# Patient Record
Sex: FEMALE | Race: WHITE
[De-identification: names, ages, dates, MRNs, and addresses within clinical notes are randomized per-mention and may not be internally consistent; named-entity substitution may affect disease eponyms.]

---

## 2018-04-18 ENCOUNTER — HOSPITAL ENCOUNTER (OUTPATIENT)
Dept: HOSPITAL 35 - PAIN | Age: 68
End: 2018-04-18
Attending: ANESTHESIOLOGY
Payer: COMMERCIAL

## 2018-04-18 VITALS — HEIGHT: 60.98 IN | WEIGHT: 145.8 LBS | BODY MASS INDEX: 27.53 KG/M2

## 2018-04-18 VITALS — DIASTOLIC BLOOD PRESSURE: 67 MMHG | SYSTOLIC BLOOD PRESSURE: 126 MMHG

## 2018-04-18 DIAGNOSIS — M17.0: ICD-10-CM

## 2018-04-18 DIAGNOSIS — G89.29: Primary | ICD-10-CM

## 2018-04-25 ENCOUNTER — HOSPITAL ENCOUNTER (OUTPATIENT)
Dept: HOSPITAL 35 - PAIN | Age: 68
Discharge: HOME | End: 2018-04-25
Attending: ANESTHESIOLOGY
Payer: COMMERCIAL

## 2018-04-25 VITALS — BODY MASS INDEX: 26.45 KG/M2 | WEIGHT: 145.6 LBS | HEIGHT: 62.01 IN

## 2018-04-25 VITALS — SYSTOLIC BLOOD PRESSURE: 132 MMHG | DIASTOLIC BLOOD PRESSURE: 69 MMHG

## 2018-04-25 DIAGNOSIS — Z79.01: ICD-10-CM

## 2018-04-25 DIAGNOSIS — G89.29: Primary | ICD-10-CM

## 2018-04-25 DIAGNOSIS — Z79.899: ICD-10-CM

## 2018-04-25 DIAGNOSIS — M17.0: ICD-10-CM

## 2018-04-25 DIAGNOSIS — Z98.890: ICD-10-CM

## 2018-05-14 ENCOUNTER — HOSPITAL ENCOUNTER (OUTPATIENT)
Dept: HOSPITAL 35 - HYPER | Age: 68
End: 2018-05-14
Attending: PHYSICIAN ASSISTANT
Payer: COMMERCIAL

## 2018-05-14 DIAGNOSIS — I10: ICD-10-CM

## 2018-05-14 DIAGNOSIS — L97.521: ICD-10-CM

## 2018-05-14 DIAGNOSIS — Z90.710: ICD-10-CM

## 2018-05-14 DIAGNOSIS — I70.245: Primary | ICD-10-CM

## 2018-05-14 DIAGNOSIS — M06.9: ICD-10-CM

## 2018-05-31 ENCOUNTER — HOSPITAL ENCOUNTER (OUTPATIENT)
Dept: HOSPITAL 35 - HYPER | Age: 68
End: 2018-05-31
Attending: PREVENTIVE MEDICINE
Payer: COMMERCIAL

## 2018-05-31 DIAGNOSIS — Z90.710: ICD-10-CM

## 2018-05-31 DIAGNOSIS — L97.521: ICD-10-CM

## 2018-05-31 DIAGNOSIS — I10: ICD-10-CM

## 2018-05-31 DIAGNOSIS — I73.00: ICD-10-CM

## 2018-05-31 DIAGNOSIS — I70.245: Primary | ICD-10-CM

## 2018-05-31 DIAGNOSIS — M06.9: ICD-10-CM

## 2018-06-13 ENCOUNTER — HOSPITAL ENCOUNTER (OUTPATIENT)
Dept: HOSPITAL 35 - HYPER | Age: 68
End: 2018-06-13
Attending: PREVENTIVE MEDICINE
Payer: COMMERCIAL

## 2018-06-13 DIAGNOSIS — I10: ICD-10-CM

## 2018-06-13 DIAGNOSIS — M06.9: ICD-10-CM

## 2018-06-13 DIAGNOSIS — I70.245: Primary | ICD-10-CM

## 2018-06-13 DIAGNOSIS — L97.521: ICD-10-CM

## 2018-06-13 DIAGNOSIS — I73.00: ICD-10-CM

## 2018-06-27 ENCOUNTER — HOSPITAL ENCOUNTER (OUTPATIENT)
Dept: HOSPITAL 35 - HYPER | Age: 68
End: 2018-06-27
Attending: PREVENTIVE MEDICINE
Payer: COMMERCIAL

## 2018-06-27 DIAGNOSIS — I10: ICD-10-CM

## 2018-06-27 DIAGNOSIS — L97.521: ICD-10-CM

## 2018-06-27 DIAGNOSIS — I70.245: Primary | ICD-10-CM

## 2018-06-27 DIAGNOSIS — I73.00: ICD-10-CM

## 2018-06-27 DIAGNOSIS — M06.9: ICD-10-CM

## 2018-12-12 ENCOUNTER — HOSPITAL ENCOUNTER (OUTPATIENT)
Dept: HOSPITAL 35 - PAIN | Age: 68
End: 2018-12-12
Attending: ANESTHESIOLOGY
Payer: COMMERCIAL

## 2018-12-12 VITALS — HEIGHT: 62.01 IN | WEIGHT: 143.8 LBS | BODY MASS INDEX: 26.13 KG/M2

## 2018-12-12 VITALS — SYSTOLIC BLOOD PRESSURE: 117 MMHG | DIASTOLIC BLOOD PRESSURE: 65 MMHG

## 2018-12-12 DIAGNOSIS — Z79.899: ICD-10-CM

## 2018-12-12 DIAGNOSIS — G89.29: Primary | ICD-10-CM

## 2018-12-12 DIAGNOSIS — M79.662: ICD-10-CM

## 2018-12-12 DIAGNOSIS — Z96.653: ICD-10-CM

## 2021-11-23 ENCOUNTER — HOSPITAL ENCOUNTER (OUTPATIENT)
Dept: HOSPITAL 35 - PAIN | Age: 71
Discharge: HOME | End: 2021-11-23
Attending: ANESTHESIOLOGY
Payer: COMMERCIAL

## 2021-11-23 VITALS — BODY MASS INDEX: 27.07 KG/M2 | WEIGHT: 149 LBS | HEIGHT: 62.01 IN

## 2021-11-23 VITALS — DIASTOLIC BLOOD PRESSURE: 74 MMHG | SYSTOLIC BLOOD PRESSURE: 148 MMHG

## 2021-11-23 DIAGNOSIS — M48.061: ICD-10-CM

## 2021-11-23 DIAGNOSIS — Z79.899: ICD-10-CM

## 2021-11-23 DIAGNOSIS — Z98.890: ICD-10-CM

## 2021-11-23 DIAGNOSIS — Z88.8: ICD-10-CM

## 2021-11-23 DIAGNOSIS — M19.90: ICD-10-CM

## 2021-11-23 DIAGNOSIS — M48.02: ICD-10-CM

## 2021-11-23 DIAGNOSIS — G89.29: ICD-10-CM

## 2021-11-23 DIAGNOSIS — M54.12: Primary | ICD-10-CM

## 2021-11-23 DIAGNOSIS — Z87.19: ICD-10-CM

## 2021-11-23 DIAGNOSIS — G43.909: ICD-10-CM

## 2021-11-23 DIAGNOSIS — M06.9: ICD-10-CM

## 2021-11-23 NOTE — NUR
Pain Clinic Assessment:
 
1. History of Osteoarthritis:
UNKNOWN
   History of Rheumatoid Arthritis:
HANDS
ANKLES
FEET
KNEES
ELBOWS
 
2. Height: 5 ft. 2 in. 157.5 cm.
   Weight: 149.0 lb.  oz. 67.586 kg.
   Patient's BMI: 27.2
 
3. Vital Signs:
   BP: 148/74 Pulse: 75 Resp: 16
   Temp:  02 Sat: 100 ECG Mon:
 
4. Pain Intensity: 6
 
5. Fall Risk:
   Dizziness: N  Needs help standing or walking: N
   Fallen in the last 3 months: N
   Fall risk comments:
 
 
6. Patient on Blood Thinner: None
 
7. History of Hypertension: N
 
8. Opioid Therapy greater than 6 weeks: N
   Opiate Contract Signed:
 
9. Risk Assessment Tool Provided: 0-LOW
 
10. Functional Assessment Tool: 50/70
 
11. Recreational Drug Use: Never Drug Type:
    Tobacco Use: Never Smoker Tobacco Type:
       Amount or Packs/day:  How Many Years:
    Alcohol Use: No  Frequency:  Quant:

## 2021-11-24 NOTE — HPC
[facility]
[address]
[place]   [address]                     PAIN MANAGEMENT CONSULTATION  
_______________________________________________________________________________
 
Name:       JUAN PABLO ALEXANDER           [facility] #:                     [facility]      [id number]                       Account #:      [id number]  
Admission:  11/23/21    Attend Phys:    Garrett Proctor DO  
Discharge:              Date of Birth:  [birth year]  
                                                          Report #: [id number]
                                                                    [id number] 
_______________________________________________________________________________
THIS REPORT FOR:  
 
cc:  Shane Gutierrez MD, Mark S. MD Johnson, James E. DO                                          ~
 
cc:     Dr. Femi Bernardo
DATE OF SERVICE: 11/23/2021
 
REFERRING PHYSICIAN:  Dr. Femi Bernardo, Neurosurgeon
 
CHIEF COMPLAINT:  Neck pain, left upper extremity pain and paresthesias, left 
upper back pain.
 
HISTORY OF PRESENT ILLNESS:  As you know, the patient is a pleasant 71-year-old 
female who reports a longstanding history of left scapular pain, left arm pain 
with paresthesias.  She states the symptoms began in mid summer 2021 without 
inciting injury or traumas.  She was seen by orthopedics.  She felt that her 
symptoms may be related to shoulder issues:  She underwent left shoulder x-ray 
imaging, which were negative.  She was then sent for x-ray imaging of the 
cervical spine after she was complaining of pins and needle sensation in the 
left upper extremity.  She has been diagnosed with cervical radiculopathy and 
then referred on to Neurosurgery.  The patient was seen by Neurosurgery and 
advised that surgical options could be entertained to address the findings at 
C5-C6 and C6-C7 level, but it was determined that the patient should trial 
conservative treatment initially.  She was subsequently then referred on to our 
clinic to discuss treatment options.  She has now started physical therapy.  She
takes tramadol p.r.n., at bedtime.
 
The patient reports today pain is constant.  She describes the pain as a 
burning, shooting, aching and stabbing sensation.  Places current pain score 
6/10, daily average of 5/10, worst pain has been is 8/10.  The patient states 
the pain is exacerbated with rotating her neck to the left and utilizing a left 
upper extremity.  Nothing tends to improve pain.  She has been referred to our 
service.  Discussed cervical epidural injections under fluoroscopic guidance.
 
ALLERGIES:  No known drug allergies.
 
CURRENT MEDICATIONS:  Vitamin D3, ascorbic acid, Simponi, tramadol, 
furosemide, Claritin, ropinirole, atenolol.
 
SOCIAL HISTORY:  The patient denies tobacco, alcohol or IV or illicit drug use. 
She is accompanied by her  present in room today.
 
IMAGING:  MRI cervical spine shows reversal of cervical lordosis, C4-C5 
vertebral bodies are fused.  There is retrolisthesis of C5 on C6 measuring 2 mm.
 
 
 
[place] [address]                     PAIN MANAGEMENT CONSULTATION  
_______________________________________________________________________________
 
Name:       JUAN PABLO ALEXANDER           [facility] #:                     [facility]      [id number]                       Account #:      [id number]  
Admission:  11/23/21    Attend Phys:    Garrett Proctor DO  
Discharge:              Date of Birth:  [birth year]  
                                                          Report #: [id number]
                                                                    [id number] 
_______________________________________________________________________________
 There is anterolisthesis of C7 on T1 measuring 2 mm.  Minimal endplate edema is
noted at C5-C6 and C6-C7.  There is high-grade foraminal stenosis at C5-C6 with 
thecal sac reduced to 6 mm at C6-C7.  There is also buckling of the ligamentum 
flavum, moderate central canal stenosis, foraminal narrowing, that is moderate 
in nature and compression of the canal to 7 mm.
 
PQRS:  The patient has known arthritic changes of cervical spine, lumbar spine, 
bilateral hips and knees.  She is treated for rheumatoid arthritis with IV 
infusions.  She is placing pain intensity 6/10.  She is not a fall risk, has not
had a fall in last 3 months.  She is not on blood thinners nor is she treated 
for hypertension.  She is on opioid like medications, has a low opioid addiction
potential.  Pain impact is 50/70, severe interference of daily activities 
secondary to pain.
 
PHYSICAL EXAMINATION:
VITAL SIGNS:  Blood pressure 140/74, pulse 75, respiratory rate 16 and 
unlabored.  The patient is 100% on room air.  Height 5 feet 2 inches tall, 
weight 149 pounds, BMI calculated 27.2.
GENERAL:  Well-developed, well-nourished, well-hydrated 71-year-old female 
appearing stated age, pain is rated today 6/10.
HEENT:  Normocephalic, atraumatic.  Pupils equal, round and responsive.  Speech 
is fluent.  She is wearing a mask in compliance with COVID-19 regulations.
LUNGS:  Clear.  She is able to complete sentences.  There is no audible 
wheezing, rhonchi or rales.
EXTREMITIES:  Show no clubbing, no cyanosis and no edema.
MUSCULOSKELETAL:  Upper extremity strength appears symmetrical.  Muscle bulk and
tone is symmetrical in upper extremities.  Deep tendon reflexes are increased at
the biceps bilaterally.  Babinski is negative.  No clonus.  Cervical provocation
testing is met with increasing pain.  Spurling's test is positive for left, 
negative for right.  She is intact to light touch from C5 through T1 dermatomes.
 Tinel sign is negative.  Phalen sign is negative.  Lhermitte's phenomenon is 
negative.
 
ASSESSMENT:
1.  Cervical radiculopathy.
2.  Severe neural foraminal stenosis of the cervical spine.
3.  Cervical spinal stenosis.
4.  Cervical myelopathy.
5.  Chronic intractable pain.
 
PLAN:
1.  Based on today's physical exam and history the patient has provided, the 
description the patient uses in regards to pain as well as the descriptors she 
uses in regards to symptoms, likely source of the patient's pain is cervical 
radiculopathy.  The patient has seen Neurosurgery and they have advised the 
patient to trial conservative treatment initially.  If these are then 
 
 
 
[facility]
[address]
[place]   [address]                     PAIN MANAGEMENT CONSULTATION  
_______________________________________________________________________________
 
Name:       BENJAMINMARIOMELISSA PACE           Room #:                     REG CARLOTTA DOMINGUEZ#:      [id number]                       Account #:      [id number]  
Admission:  11/23/21    Attend Phys:    Garrett Proctor DO  
Discharge:              Date of Birth:  [birth year]  
                                                          Report #: [id number]
                                                                    [id number] 
_______________________________________________________________________________
unsuccessful in alleviating symptoms, surgical options could be entertained.  
The patient was to begin physical therapy this Friday.  She has not started the 
physical program to date.  She was advised to delay undergoing the epidural 
injection first.  If this is unsuccessful, then moving towards physical therapy.
 The patient and I also discussed the other treatment options we have to address
cervical radiculopathy.  Following was discussed with the patient today.
 
We discussed the physical therapy, stretching exercises, traction techniques for
which she will initiate treatment Friday.  We discussed suggestions and 
medication management to add neuropathic medications such as amitriptyline, 
nortriptyline, Cymbalta, Lyrica or gabapentin.  We discussed cervical epidural 
injections under fluoroscopic guidance.  We also discussed surgical options with
the patient.  After reviewing risks and benefits of all proposed treatment 
options, the patient chose to begin with a cervical epidural injection under 
fluoroscopic guidance.
2.  The patient has been advised risks and benefits of a cervical epidural 
injection.  These risks include but are not necessarily limited to bleeding, 
bruising, infection, worsening of pain, no relief of pain, temporary or 
permanent muscle weakness, temporary or permanent nerve damage, possible 
paralysis, post-dural puncture headache, paralysis, and death.  The patient 
states she understood and wished to proceed.
3.  The patient was provided a prescription of Medrol Dosepak.  She will begin 
the Dosepak starting tomorrow.  The Dosepak was sent to the local pharmacy.  I 
would not recommend that she start the medication until tomorrow morning and 
follow directions for taking that medication.
4.  We have provided the patient with a short prescription of 
hydrocodone/acetaminophen 5/325 one tab p.o. q. 8 hours p.r.n. for pain and 
given the patient #30 tablets.  These were sent to local pharmacy.  The patient 
was advised to watch for side effects with this use including somnolence, 
decreased mental acuity, disorientation, confusion, mental slowing and 
constipation.  If she notes these side effects, discontinue immediately.
5.  The patient did not tolerate the cervical epidural injection to any great 
degree.  We were only able to obtain about 50% injectate before the patient's 
pain intensified in her recurrent and constant distribution of symptoms 
consistent with the neuroforaminal stenosis and central canal stenosis noted.  
We contacted the patient's neurosurgeon, Dr. Femi Bernardo and discussed that with
Dr. Bernardo.  He has recommended the patient see if her symptoms do improve with 
the injection that we were able to obtain.  If this is not successful, then he 
would be more than willing to see the patient back in followup visit to discuss 
surgical options more rapidly.
6.  We wish to thank Dr. Bernardo for the opportunity to see the patient in 
consultation.  We are hopeful the patient will see good benefit with the 
injection provided today.  We will keep you apprised of response to treatment.  
Again, we wish to thank you for the opportunity to see the patient in 
consultation.
 
 
 
 
[facility]
[address] [place]   [address]                     PAIN MANAGEMENT CONSULTATION  
_______________________________________________________________________________
 
Name:       JUAN PABLO ALEXANDER           Room #:                     REG CARLOTTA OSEGUERA#:      [id number]                       Account #:      [id number]  
Admission:  11/23/21    Attend Phys:    Garrett Proctor DO  
Discharge:              Date of Birth:  [birth year]  
                                                          Report #: [id number]
                                                                    [id number] 
_______________________________________________________________________________
DESCRIPTION OF PROCEDURE:  C7-T1 cervical epidural steroid injection under 
fluoroscopic guidance.
 
After obtaining written consent, the patient was taken back to fluoroscopy 
suite, placed in prone position with separate pillows under chest and forehead 
to decrease cervical lordosis.  Skin overlying the cervical area then prepped 
and draped in aseptic fashion.  C7-T1 cervical interspace was identified by AP 
fluoroscopy.  Skin and subcutaneous tissue overlying target site of injection 
was then anesthetized with 3 mL 1% lidocaine.
 
A 20 gauge 3-1/2 inch Tuohy needle was advanced under fluoroscopic guidance 
towards the epidural space using a midline approach.  Epidural space identified 
using loss of resistance to air technique.  After negative aspiration for heme 
or cerebrospinal fluid, 1 mL of Omnipaque injected.  A cervical epidurogram was 
confirmed using both AP and oblique fluoroscopy.  After negative aspiration for 
heme or cerebrospinal fluid, 2 mL of solution containing 2 mL 40 mg per mL 80 mg
total triamcinolone and 2 mL of lidocaine 1% was injected slowly.  The patient 
had acute onset of pain along the distribution of her symptoms have been present
with intensification of the neck and upper back pain where her symptoms have 
been most prevalent.  She was unable to tolerate the complete injection.  We 
discontinued with 50% of the injectate being provided.  Needle was removed and 
sterile bandage was placed over injection site.  The patient was noted to be 
able to move all 4 extremities after the procedure.  She had pain, but no 
paresthesias.  No loss of function and was able to have a similar  strength 
bilaterally after the procedure.
 
The patient was given medications to assist in pain control as she recovers from
the procedure today.  Those medications were sent to local pharmacy.  After 
meeting our discharge criteria, the patient was then discharged home.
 
 
 
 
 
 
 
 
 
 
 
 
 
 
 
  <ELECTRONICALLY SIGNED>
   By: Garrett Proctor DO          
  11/24/21     0815
D: 11/23/21 1530                           _____________________________________
T: 11/23/21 2144                           Garrett Proctor DO            /nt